# Patient Record
Sex: FEMALE | URBAN - METROPOLITAN AREA
[De-identification: names, ages, dates, MRNs, and addresses within clinical notes are randomized per-mention and may not be internally consistent; named-entity substitution may affect disease eponyms.]

---

## 2017-12-27 ENCOUNTER — APPOINTMENT (OUTPATIENT)
Age: 77
Setting detail: DERMATOLOGY
End: 2017-12-28

## 2017-12-27 DIAGNOSIS — L85.3 XEROSIS CUTIS: ICD-10-CM

## 2017-12-27 DIAGNOSIS — B07.8 OTHER VIRAL WARTS: ICD-10-CM

## 2017-12-27 DIAGNOSIS — L71.8 OTHER ROSACEA: ICD-10-CM

## 2017-12-27 DIAGNOSIS — L72.0 EPIDERMAL CYST: ICD-10-CM

## 2017-12-27 PROBLEM — H91.90 UNSPECIFIED HEARING LOSS, UNSPECIFIED EAR: Status: ACTIVE | Noted: 2017-12-27

## 2017-12-27 PROCEDURE — OTHER ACNE SURGERY: OTHER

## 2017-12-27 PROCEDURE — OTHER LIQUID NITROGEN: OTHER

## 2017-12-27 PROCEDURE — OTHER COUNSELING: OTHER

## 2017-12-27 PROCEDURE — 99203 OFFICE O/P NEW LOW 30 MIN: CPT | Mod: 25

## 2017-12-27 PROCEDURE — OTHER TREATMENT REGIMEN: OTHER

## 2017-12-27 PROCEDURE — 10040 ACNE SURGERY: CPT

## 2017-12-27 PROCEDURE — OTHER PRESCRIPTION: OTHER

## 2017-12-27 PROCEDURE — 17110 DESTRUCT B9 LESION 1-14: CPT

## 2017-12-27 RX ORDER — TRETINOIN 0.5 MG/G
CREAM TOPICAL
Qty: 1 | Refills: 2 | Status: ERX | COMMUNITY
Start: 2017-12-27

## 2017-12-27 ASSESSMENT — LOCATION SIMPLE DESCRIPTION DERM
LOCATION SIMPLE: LEFT PRETIBIAL REGION
LOCATION SIMPLE: RIGHT CHEEK
LOCATION SIMPLE: RIGHT PRETIBIAL REGION
LOCATION SIMPLE: RIGHT SUPERIOR EYELID
LOCATION SIMPLE: LEFT CHEEK
LOCATION SIMPLE: LEFT SUPERIOR EYELID
LOCATION SIMPLE: RIGHT FOREHEAD
LOCATION SIMPLE: POSTERIOR SCALP
LOCATION SIMPLE: LEFT FOREHEAD

## 2017-12-27 ASSESSMENT — LOCATION DETAILED DESCRIPTION DERM
LOCATION DETAILED: RIGHT CENTRAL MALAR CHEEK
LOCATION DETAILED: MID-OCCIPITAL SCALP
LOCATION DETAILED: RIGHT LATERAL SUPERIOR EYELID
LOCATION DETAILED: LEFT INFERIOR MEDIAL FOREHEAD
LOCATION DETAILED: LEFT MEDIAL SUPERIOR EYELID
LOCATION DETAILED: RIGHT SUPERIOR MEDIAL MALAR CHEEK
LOCATION DETAILED: LEFT MEDIAL MALAR CHEEK
LOCATION DETAILED: RIGHT PROXIMAL PRETIBIAL REGION
LOCATION DETAILED: RIGHT INFERIOR FOREHEAD
LOCATION DETAILED: LEFT INFERIOR LATERAL FOREHEAD
LOCATION DETAILED: LEFT CENTRAL BUCCAL CHEEK
LOCATION DETAILED: LEFT PROXIMAL PRETIBIAL REGION
LOCATION DETAILED: LEFT SUPERIOR CENTRAL MALAR CHEEK
LOCATION DETAILED: RIGHT INFERIOR MEDIAL FOREHEAD

## 2017-12-27 ASSESSMENT — LOCATION ZONE DERM
LOCATION ZONE: SCALP
LOCATION ZONE: EYELID
LOCATION ZONE: LEG
LOCATION ZONE: FACE

## 2017-12-27 NOTE — PROCEDURE: LIQUID NITROGEN
Detail Level: Detailed
Include Z78.9 (Other Specified Conditions Influencing Health Status) As An Associated Diagnosis?: No
Number Of Freeze-Thaw Cycles: 2 freeze-thaw cycles
Post-Care Instructions: I reviewed with the patient in detail post-care instructions. Patient is to wear sunprotection, and avoid picking at any of the treated lesions. Pt may apply Vaseline to crusted or scabbing areas.
Consent: The patient's consent was obtained including but not limited to risks of crusting, scabbing, blistering, scarring, darker or lighter pigmentary change, recurrence, incomplete removal and infection.
Medical Necessity Clause: This procedure was medically necessary because the lesions that were treated were: viral infection
Medical Necessity Information: It is in your best interest to select a reason for this procedure from the list below. All of these items fulfill various CMS LCD requirements except the new and changing color options.

## 2017-12-27 NOTE — PROCEDURE: ACNE SURGERY
Extraction Method: electrocautery
Render Post-Care Instructions In Note?: no
Post-Care Instructions: I reviewed with the patient in detail post-care instructions. Patient is to keep the treatment areaas dry overnight, and then apply bacitracin twice daily until healed. Patient may apply hydrogen peroxide soaks to remove any crusting.
Acne Type: Milial cysts
Prep Text (Optional): Prior to removal the treatment areas were prepped in the usual fashion.
Consent was obtained and risks were reviewed including but not limited to scarring, infection, bleeding, scabbing, incomplete removal, and allergy to anesthesia.
Detail Level: Detailed

## 2018-01-10 ENCOUNTER — APPOINTMENT (OUTPATIENT)
Age: 78
Setting detail: DERMATOLOGY
End: 2018-01-10

## 2018-01-10 DIAGNOSIS — L71.8 OTHER ROSACEA: ICD-10-CM

## 2018-01-10 PROCEDURE — 99213 OFFICE O/P EST LOW 20 MIN: CPT

## 2018-01-10 PROCEDURE — OTHER PRESCRIPTION: OTHER

## 2018-01-10 PROCEDURE — OTHER COUNSELING: OTHER

## 2018-01-10 PROCEDURE — OTHER TREATMENT REGIMEN: OTHER

## 2018-01-10 RX ORDER — MINOCYCLINE HYDROCHLORIDE 50 MG/1
CAPSULE ORAL
Qty: 120 | Refills: 5 | Status: ERX | COMMUNITY
Start: 2018-01-10

## 2018-01-10 NOTE — PROCEDURE: TREATMENT REGIMEN
Continue Regimen: Tretinoin 0.05% qhs
Plan: Use sunscreen daily and Cetaphil moisturizer
Initiate Treatment: Minocycline 50mg two po bid
Detail Level: Zone
Discontinue Regimen: Doxycycline